# Patient Record
(demographics unavailable — no encounter records)

---

## 2024-10-23 NOTE — HISTORY OF PRESENT ILLNESS
[FreeTextEntry1] : LMP:   pt is 55 yo  referred by Caitie Garcia NP for uterine fibroids  Pelvic/TVUS [facility] (10/17/24): A/V UT, 3 fibroids seen. (FIB 1 L lateral, subserosal, FIB 2 R pedunculated, FIB 3 Ant, intramural) ENDO- 5.0 mm. ROV 1.0 clear appearing follicle, LOV 4.2 cm clear appearing cyst   Health maintenance  Lpap- (10/10/24) wnl, endocervical/ transformation zone component absent. Lhpv- (10/10/24) not detected  Lmammo- (10/17/24) negative, BIRADS 1 benign  Lcolonoscopy- (2021) Ldexa (22) osteopenia   PObHx:  x3 PGynHx: +cyst, +fibroids    PMH: PSH: Left Ankle surgery (2019) Appendectomy () FamHx: - Breast Cancer, Maternal aunt -HTN, Mother -Heart disease, Mother ( at age 74) -Thyroid disorder, Mother -HLD, Mother -Lung disorder, Mother SocialHx: No tob/drug, occasional ETOH Medications: Allergies: NKDA [Y] : Positive pregnancy history [___] : #3 ([unfilled]): [Pregnancy History] : Vaginal delivery [MensesFreq] : 28 [MensesLength] : 5 [PGHxTotal] : 3 [[facility] Care Corporationiving] : 3 [Menarche Age: ____] : age at menarche was [unfilled] [FreeTextEntry3] : essure

## 2024-11-15 NOTE — REASON FOR VISIT
Here is Indra's note from today, no new findings other than complaint of new leg symptoms.  [Consultation] : consultation for [FreeTextEntry2] :  uterine leiomyoma [FreeTextEntry1] :  Caitie Garcia, NP

## 2024-11-15 NOTE — HISTORY OF PRESENT ILLNESS
[FreeTextEntry1] : LMP: 2024  pt is a 55 yo referred by Caitie Garcia NP for uterine leiomyoma. Reports monthly menses, normal flow, lasting 5 days. Denies dysmenorrhea. Denies urinary or GI symptoms. c/o some LLQ pressure/discomfort.that prompted workup per pt.   Pelvic/ TVUS (10/17/24) [facility]- Uterus: 9.76 x 4.77 x 8.32cm  Fibroid 1- LT/LAT Subserosal  5.05 x 4.43 cm Fibroid 2- RT Pedunculated 3.76 x 3.52 cm Fibroid 3- ANT Intramural 2.86 x 2.09 cm Endometrium: 5.0 mm ROV: 2.91 x 2.08 x 2.47 cm Follicle- 1.06 cm LOV: 5.23 x 3.60 x 4.51 cm Cyst- 4.06 x 4.24 cm   Health maintenance  Lpap- (10/10/24) NILM Lhpv- (10/10/24) not detected  Lmammo- (10/17/24) negative, BI-RADS 1 Lcolonoscopy-  normal   PObHx: , NSVDx3 PGynHx: + cyst + fibroids, denies abnormal pap/STI. SA, male, monogamous.   PMH: Patient denies PSH: Appendectomy (), ankle surgery () FamHx: Breast Cancer-Maternal Aunt Mother- HTN, Heart disease, thyroid disorder, HLD, lung disorder SocialHx:  No tob/drug, ETOH occ Medications:None Allergies: NKDA

## 2024-11-15 NOTE — PHYSICAL EXAM
[Chaperone Present] : A chaperone was present in the examining room during all aspects of the physical examination [92139] : A chaperone was present during the pelvic exam. [FreeTextEntry2] : GABRIELLA Lakhani  [Appropriately responsive] : appropriately responsive [Alert] : alert [No Acute Distress] : no acute distress [No Lymphadenopathy] : no lymphadenopathy [Soft] : soft [Non-tender] : non-tender [Non-distended] : non-distended [No HSM] : No HSM [No Lesions] : no lesions [No Mass] : no mass [Oriented x3] : oriented x3 [Labia Majora] : normal [Labia Minora] : normal [Normal] : normal [Uterine Adnexae] : normal  [___] : [unfilled] cm mass on the left [FreeTextEntry6] : 10 wk globular mobile uterus, palpable fibroids

## 2024-11-15 NOTE — PHYSICAL EXAM
[Chaperone Present] : A chaperone was present in the examining room during all aspects of the physical examination [[id number]] : A chaperone was present during the pelvic exam. [FreeTextEntry2] : GABRIELLA Lakhani  [Appropriately responsive] : appropriately responsive [Alert] : alert [No Acute Distress] : no acute distress [No Lymphadenopathy] : no lymphadenopathy [Soft] : soft [Non-tender] : non-tender [Non-distended] : non-distended [No HSM] : No HSM [No Lesions] : no lesions [No Mass] : no mass [Oriented x3] : oriented x3 [Labia Majora] : normal [Labia Minora] : normal [Normal] : normal [Uterine Adnexae] : normal  [___] : [unfilled] cm mass on the left [FreeTextEntry6] : 10 wk globular mobile uterus, palpable fibroids

## 2024-11-15 NOTE — HISTORY OF PRESENT ILLNESS
[FreeTextEntry1] : LMP: 2024  pt is a 53 yo referred by Caitie Garcia NP for uterine leiomyoma. Reports monthly menses, normal flow, lasting 5 days. Denies dysmenorrhea. Denies urinary or GI symptoms. c/o some LLQ pressure/discomfort.that prompted workup per pt.   Pelvic/ TVUS (10/17/24) [facility]- Uterus: 9.76 x 4.77 x 8.32cm  Fibroid 1- LT/LAT Subserosal  5.05 x 4.43 cm Fibroid 2- RT Pedunculated 3.76 x 3.52 cm Fibroid 3- ANT Intramural 2.86 x 2.09 cm Endometrium: 5.0 mm ROV: 2.91 x 2.08 x 2.47 cm Follicle- 1.06 cm LOV: 5.23 x 3.60 x 4.51 cm Cyst- 4.06 x 4.24 cm   Health maintenance  Lpap- (10/10/24) NILM Lhpv- (10/10/24) not detected  Lmammo- (10/17/24) negative, BI-RADS 1 Lcolonoscopy-  normal   PObHx: , NSVDx3 PGynHx: + cyst + fibroids, denies abnormal pap/STI. SA, male, monogamous.   PMH: Patient denies PSH: Appendectomy (), ankle surgery () FamHx: Breast Cancer-Maternal Aunt Mother- HTN, Heart disease, thyroid disorder, HLD, lung disorder SocialHx:  No tob/drug, ETOH occ Medications:None Allergies: NKDA

## 2024-11-15 NOTE — PHYSICAL EXAM
[Chaperone Present] : A chaperone was present in the examining room during all aspects of the physical examination [[id number]] : A chaperone was present during the pelvic exam. [FreeTextEntry2] : GABRIELLA Lakhani  [Appropriately responsive] : appropriately responsive [Alert] : alert [No Acute Distress] : no acute distress [No Lymphadenopathy] : no lymphadenopathy [Soft] : soft [Non-tender] : non-tender [Non-distended] : non-distended [No HSM] : No HSM [No Lesions] : no lesions [No Mass] : no mass [Oriented x3] : oriented x3 [Labia Majora] : normal [Labia Minora] : normal [Normal] : normal [Uterine Adnexae] : normal  [___] : [unfilled] cm mass on the left [FreeTextEntry6] : 10 wk globular mobile uterus, palpable fibroids

## 2024-11-15 NOTE — CONSULT LETTER
[Dear  ___] : Dear ~CYNTHIA, [FreeTextEntry1] : I had the pleasure of evaluating your patient, AI DE LA CRUZ. Please see my note enclosed for full details.   Thank you for allowing me to participate in the care of this patient. If you have any questions, please do not hesitate to contact me.   Sincerely,   Ashley Solorzano MD, FACOG, [facility]  [address] [place] [address] Phone: [phone]  Fax: [phone]

## 2024-11-15 NOTE — DISCUSSION/SUMMARY
[FreeTextEntry1] : 53 yo with fibroids, predominantly asymptomatic, and recent LLQ pain with finding of LOV cyst.  -Discussed possible sources of pain including LOV cyst, fibroids, or non-gyn etiologies -Given her fibroids are predominantly asymptomatic, we discussed expectant management. Discussed her bulk symptoms of pelvic pressure, possible LLQ pain, and reviewed treatment options to decresaed size of uterus include UFE and hysteredomy. We also discused that menopause will likely lead to decrease in fibroid size over time. She would like to proceed with expectant management.  -I recommend she have repeat pelvic sonogram in 6-8 wks from previous to assess left ovarian cyst. Discussed rare risk of torsion with ovarian cyst and pt should contact office or go to ED if deveops severe pain -She will follow up with her primary GYN for repeat imaging and will return as needed  I spent 50minutes of total time on the day of the encounter preparing for the visit, review of records, interacting with the patient, EHR documentation, and coordinating care.

## 2024-11-15 NOTE — HISTORY OF PRESENT ILLNESS
[FreeTextEntry1] : LMP: 2024  pt is a 53 yo referred by Caitie Garcia NP for uterine leiomyoma. Reports monthly menses, normal flow, lasting 5 days. Denies dysmenorrhea. Denies urinary or GI symptoms. c/o some LLQ pressure/discomfort.that prompted workup per pt.   Pelvic/ TVUS (10/17/24) [facility]- Uterus: 9.76 x 4.77 x 8.32cm  Fibroid 1- LT/LAT Subserosal  5.05 x 4.43 cm Fibroid 2- RT Pedunculated 3.76 x 3.52 cm Fibroid 3- ANT Intramural 2.86 x 2.09 cm Endometrium: 5.0 mm ROV: 2.91 x 2.08 x 2.47 cm Follicle- 1.06 cm LOV: 5.23 x 3.60 x 4.51 cm Cyst- 4.06 x 4.24 cm   Health maintenance  Lpap- (10/10/24) NILM Lhpv- (10/10/24) not detected  Lmammo- (10/17/24) negative, BI-RADS 1 Lcolonoscopy-  normal   PObHx: , NSVDx3 PGynHx: + cyst + fibroids, denies abnormal pap/STI. SA, male, monogamous.   PMH: Patient denies PSH: Appendectomy (), ankle surgery () FamHx: Breast Cancer-Maternal Aunt Mother- HTN, Heart disease, thyroid disorder, HLD, lung disorder SocialHx:  No tob/drug, ETOH occ Medications:None Allergies: NKDA

## 2024-11-15 NOTE — HISTORY OF PRESENT ILLNESS
[FreeTextEntry1] : LMP: 2024  pt is a 55 yo referred by Caitie Garcia NP for uterine leiomyoma. Reports monthly menses, normal flow, lasting 5 days. Denies dysmenorrhea. Denies urinary or GI symptoms. c/o some LLQ pressure/discomfort.that prompted workup per pt.   Pelvic/ TVUS (10/17/24) [facility]- Uterus: 9.76 x 4.77 x 8.32cm  Fibroid 1- LT/LAT Subserosal  5.05 x 4.43 cm Fibroid 2- RT Pedunculated 3.76 x 3.52 cm Fibroid 3- ANT Intramural 2.86 x 2.09 cm Endometrium: 5.0 mm ROV: 2.91 x 2.08 x 2.47 cm Follicle- 1.06 cm LOV: 5.23 x 3.60 x 4.51 cm Cyst- 4.06 x 4.24 cm   Health maintenance  Lpap- (10/10/24) NILM Lhpv- (10/10/24) not detected  Lmammo- (10/17/24) negative, BI-RADS 1 Lcolonoscopy-  normal   PObHx: , NSVDx3 PGynHx: + cyst + fibroids, denies abnormal pap/STI. SA, male, monogamous.   PMH: Patient denies PSH: Appendectomy (), ankle surgery () FamHx: Breast Cancer-Maternal Aunt Mother- HTN, Heart disease, thyroid disorder, HLD, lung disorder SocialHx:  No tob/drug, ETOH occ Medications:None Allergies: NKDA

## 2024-11-15 NOTE — REASON FOR VISIT
[Consultation] : consultation for [FreeTextEntry2] :  uterine leiomyoma [FreeTextEntry1] :  Caitie Garcia, NP

## 2024-11-15 NOTE — DISCUSSION/SUMMARY
[FreeTextEntry1] : 55 yo with fibroids, predominantly asymptomatic, and recent LLQ pain with finding of LOV cyst.  -Discussed possible sources of pain including LOV cyst, fibroids, or non-gyn etiologies -Given her fibroids are predominantly asymptomatic, we discussed expectant management. Discussed her bulk symptoms of pelvic pressure, possible LLQ pain, and reviewed treatment options to decresaed size of uterus include UFE and hysteredomy. We also discused that menopause will likely lead to decrease in fibroid size over time. She would like to proceed with expectant management.  -I recommend she have repeat pelvic sonogram in 6-8 wks from previous to assess left ovarian cyst. Discussed rare risk of torsion with ovarian cyst and pt should contact office or go to ED if deveops severe pain -She will follow up with her primary GYN for repeat imaging and will return as needed  I spent 50minutes of total time on the day of the encounter preparing for the visit, review of records, interacting with the patient, EHR documentation, and coordinating care.

## 2024-11-15 NOTE — CONSULT LETTER
[Dear  ___] : Dear ~CYNTHIA, [FreeTextEntry1] : I had the pleasure of evaluating your patient, AI DE LA CRUZ. Please see my note enclosed for full details.   Thank you for allowing me to participate in the care of this patient. If you have any questions, please do not hesitate to contact me.   Sincerely,   Ashley Solorzano MD, FACOG, [facility]  [address] [place] [address] Phone: [phone]  Fax: [phone]

## 2024-11-15 NOTE — PHYSICAL EXAM
[Chaperone Present] : A chaperone was present in the examining room during all aspects of the physical examination [[id number]] : A chaperone was present during the pelvic exam. [FreeTextEntry2] : GABRIELLA Lakhani  [Appropriately responsive] : appropriately responsive [Alert] : alert [No Acute Distress] : no acute distress [No Lymphadenopathy] : no lymphadenopathy [Soft] : soft [Non-tender] : non-tender [Non-distended] : non-distended [No HSM] : No HSM [No Lesions] : no lesions [No Mass] : no mass [Oriented x3] : oriented x3 [Labia Majora] : normal [Labia Minora] : normal [Normal] : normal [Uterine Adnexae] : normal  [___] : [unfilled] cm mass on the left [FreeTextEntry6] : 10 wk globular mobile uterus, palpable fibroids

## 2024-11-15 NOTE — CONSULT LETTER
[Dear  ___] : Dear ~CYNTHIA, [FreeTextEntry1] : I had the pleasure of evaluating your patient, AI DE LA CRUZ. Please see my note enclosed for full details.   Thank you for allowing me to participate in the care of this patient. If you have any questions, please do not hesitate to contact me.   Sincerely,   Ashley Solorzano MD, FACOG, [facility]  [address] [place] [address] Phone: [phone]  Fax: [phone]

## 2025-02-13 NOTE — DISCUSSION/SUMMARY
[de-identified] : The patient was advised of the diagnosis. The natural history of the pathology was explained in full to the patient in layman's terms. Several different treatment options were discussed and explained in full to the patient including the risks and benefits of both surgical and non-surgical treatments. All questions and concerns were answered.  Lengthy discussion regarding options was had with the patient. Nonsurgical options including but not limited to cortisone, Visco supplementation, Prescription anti-inflammatory medications (both steroidal and non-steroidal), activity modification, non-impact exercise, maintaining a healthy BMI, bracing, and icing were reviewed. Surgical options including but not limited to arthroscopy, and joint replacement were discussed as was risks, benefits and alternatives of all the proposed treatments. Discussed also with the patient that they could also delay any immediate treatment options, and continue to observe and self care for the discussed problem. Discussed HEP as well as Rest, Ice and elevation. Patient had ample time to ask any questions about todays visit and the diagnosis, and all questions were answered. Patient understands the plan going forward.  Continue home strengthening and stretching program consisting of non-impact exercises and ice as needed. Return to office PRN.

## 2025-02-13 NOTE — PHYSICAL EXAM
[5___] : hamstring 5[unfilled]/5 [Negative] : negative Jacob's [] : antalgic [Left] : left knee [All Views] : anteroposterior, lateral, skyline, and anteroposterior standing [Mild patellofemoral OA] : Mild patellofemoral OA [FreeTextEntry3] : Slight Valgus [FreeTextEntry9] : Patella slight lateral  [TWNoteComboBox7] : flexion 130 degrees [de-identified] : extension 0 degrees

## 2025-02-13 NOTE — HISTORY OF PRESENT ILLNESS
[de-identified] :   Date of Injury/Onset:  Left Knee pain for 2 weeks  Mechanism of injury: NKI Have you been treated for this in the past? NO Have you had surgery for this in the past? NO Physical Therapy/ HEP: None OTC Medicines: No RX medicines: NO Heat, Ice, Elevation: None CSI or Gel Injections:  None Other Previous Treatment: Prior Imaging/Studies:    Pain: At Rest: 0/10 With Activity: 4/10 Quality of symptoms:   Affecting Sleep: Yes Stiffness upon waking, lasting greater than 30mins: Yes Difficulty with stairs: Yes Difficulty getting in and out of car: Yes Sit to stand stiffness: Yes Affects walking short/long distances? Yes Home/Work/Recreation affected? Yes